# Patient Record
Sex: MALE | Race: WHITE | Employment: OTHER | URBAN - NONMETROPOLITAN AREA
[De-identification: names, ages, dates, MRNs, and addresses within clinical notes are randomized per-mention and may not be internally consistent; named-entity substitution may affect disease eponyms.]

---

## 2022-03-05 ENCOUNTER — HOSPITAL ENCOUNTER (EMERGENCY)
Age: 67
Discharge: HOME | End: 2022-03-05
Attending: EMERGENCY MEDICINE
Payer: MEDICARE

## 2022-03-05 VITALS
OXYGEN SATURATION: 96 % | DIASTOLIC BLOOD PRESSURE: 88 MMHG | TEMPERATURE: 97.4 F | RESPIRATION RATE: 19 BRPM | SYSTOLIC BLOOD PRESSURE: 150 MMHG | HEIGHT: 73 IN | HEART RATE: 98 BPM

## 2022-03-05 DIAGNOSIS — R04.0 EPISTAXIS: Primary | ICD-10-CM

## 2022-03-05 PROCEDURE — 2580000003 HC RX 258: Performed by: EMERGENCY MEDICINE

## 2022-03-05 PROCEDURE — 99285 EMERGENCY DEPT VISIT HI MDM: CPT

## 2022-03-05 PROCEDURE — 2500000003 HC RX 250 WO HCPCS: Performed by: EMERGENCY MEDICINE

## 2022-03-05 PROCEDURE — 30901 CONTROL OF NOSEBLEED: CPT

## 2022-03-05 RX ORDER — ASPIRIN 81 MG/1
81 TABLET, CHEWABLE ORAL DAILY
COMMUNITY

## 2022-03-05 RX ADMIN — PHENYLEPHRINE HYDROCHLORIDE 1 SPRAY: 0.5 SPRAY NASAL at 01:24

## 2022-03-05 RX ADMIN — TRANEXAMIC ACID 20 MG: 100 INJECTION, SOLUTION INTRAVENOUS at 01:25

## 2022-03-05 ASSESSMENT — PAIN SCALES - GENERAL: PAINLEVEL_OUTOF10: 0

## 2022-03-05 ASSESSMENT — PAIN - FUNCTIONAL ASSESSMENT: PAIN_FUNCTIONAL_ASSESSMENT: 0-10

## 2022-03-05 NOTE — ED PROVIDER NOTES
[address]  [facility] [address]  Phone: [address]    Chief Complaint   Patient presents with    Epistaxis       HPI    Dakotah Carrillo is a 78 y.o. male who presents with noted complaint. Patient's been doing fine. He frequently gets nosebleeds. Subsequently developed some nosebleed tonight. Could not get to stop. Is been going on for about an hour. Denies difficulty bleeding syncope or other issues. PAST MEDICAL HISTORY    No past medical history on file. SURGICAL HISTORY    No past surgical history on file. CURRENT MEDICATIONS    Current Outpatient Rx   Medication Sig Dispense Refill    aspirin 81 MG chewable tablet Take 81 mg by mouth daily         ALLERGIES    No Known Allergies    FAMILY HISTORY    No family history on file. SOCIAL HISTORY    Social History     Socioeconomic History    Marital status:      Spouse name: Not on file    Number of children: Not on file    Years of education: Not on file    Highest education level: Not on file   Occupational History    Not on file   Tobacco Use    Smoking status: Not on file    Smokeless tobacco: Not on file   Substance and Sexual Activity    Alcohol use: Not on file    Drug use: Not on file    Sexual activity: Not on file   Other Topics Concern    Not on file   Social History Narrative    Not on file     Social Determinants of Health     Financial Resource Strain:     Difficulty of Paying Living Expenses: Not on file   Food Insecurity:     Worried About Running Out of Food in the Last Year: Not on file    Lori of Food in the Last Year: Not on file   Transportation Needs:     Lack of Transportation (Medical): Not on file    Lack of Transportation (Non-Medical):  Not on file   Physical Activity:     Days of Exercise per Week: Not on file    Minutes of Exercise per Session: Not on file   Stress:     Feeling of Stress : Not on file   Social Connections:     Frequency of Communication with Friends and Family: Not on file    Frequency of Social Gatherings with Friends and Family: Not on file    Attends Restorationism Services: Not on file    Active Member of Clubs or Organizations: Not on file    Attends Club or Organization Meetings: Not on file    Marital Status: Not on file   Intimate Partner Violence:     Fear of Current or Ex-Partner: Not on file    Emotionally Abused: Not on file    Physically Abused: Not on file    Sexually Abused: Not on file   Housing Stability:     Unable to Pay for Housing in the Last Year: Not on file    Number of [place] in the Last Year: Not on file    Unstable Housing in the Last Year: Not on file       REVIEW OF SYSTEMS    Positive for nosebleed. No high fever chills. No chest pain. All systems negative except as marked. PHYSICAL EXAM    VITAL SIGNS: BP (!) 150/88   Pulse 98   Temp 97.4 °F (36.3 °C) (Temporal)   Resp 19   Ht 6' 1\" (1.854 m)   SpO2 96%    Constitutional:  Alert not toxic or ill,   HENT:  normocephalic, Atraumatic, large clot removed from the left naris and right nares. .  Be stemming from the left naris   cervical Spine: Normal range of motion,  No stridor. No tenderness, Supple,  Eyes:  No discharge or  Swelling,Conjunctiva normal, PERRL, EOMI,  Respiratory: No respiratory distress,                   RADIOLOGY    No orders to display       PROCEDURES    TXA was atomized into the nostrils. Afrin was administered to the nostrils.   No large clots no posterior bleeding    CONSULTS:  None      CRITICAL CARE:  None      SCREENINGS:  BP (!) 150/88   Pulse 98   Temp 97.4 °F (36.3 °C) (Temporal)   Resp 19   Ht 6' 1\" (1.854 m)   SpO2 96%     Screening For Hypertension and Follow-up (#317)  patient informed that blood pressure is abnormal and in the pre-hypertension range and should be rechecked by primary care          ED [facility] Pertinent Labs & Imaging studies reviewed. (See chart for details)  Acute epistaxis. Clinical exam is otherwise benign. Had large clots that we removed. And administered some TXA as well as some Afrin. We will continue to monitor here. He is not on significant blood thinners other than aspirin. REASSESSMENT  1:56 AM  Patient rechecked and updated on lab/xray status, progress and results. Patient was reassessed and condition was Improved after treatment. With TXA and clamping and Afrin. .. Needs nothing else at this time. No further bleeding or other issues after above treatments. Given Afrin to go home with over the weekend. Counseled in regards to risk for tachyphylaxis.   He can follow-up with PCP and/or ENT referral      FINAL IMPRESSION    1. Epistaxis         PATIENT REFERRED TO:  Alisia Jimenes MD  [address] [place] [address]/Marco Gilbert [address]  [phone]    Call   For evaluation      DISCHARGE MEDICATIONS:  New Prescriptions    No medications on file           Geraldine Connelly MD  03/05/22 0153

## 2022-03-05 NOTE — ED NOTES
Pt stable A&O x 3 given discharge and follow up info. Pt voiced no concerns and discharged from ER to self to home. Pt ambulated out of ER with no complications .        Tan Silveira RN  03/05/22 0832

## 2022-03-05 NOTE — ED TRIAGE NOTES
Pt comes into ER room 2 from triage with a nose bleed. The patient says that he gets nose bleeds often but non that have lasted over an hour like this one.

## 2022-03-05 NOTE — ED NOTES
Dr. Therese Mendoza at bedside with nasal speculum and nose bleed tray. Bleeding has stopped and medications were given.       Dean Cadet RN  03/05/22 3726

## 2023-11-29 ENCOUNTER — HOSPITAL ENCOUNTER (EMERGENCY)
Age: 68
Discharge: HOME | End: 2023-11-29
Payer: MEDICARE

## 2023-11-29 VITALS
RESPIRATION RATE: 16 BRPM | OXYGEN SATURATION: 96 % | DIASTOLIC BLOOD PRESSURE: 82 MMHG | SYSTOLIC BLOOD PRESSURE: 136 MMHG | HEIGHT: 73 IN | TEMPERATURE: 97.6 F | BODY MASS INDEX: 28.49 KG/M2 | HEART RATE: 99 BPM | WEIGHT: 215 LBS

## 2023-11-29 DIAGNOSIS — R04.0 EPISTAXIS: Primary | ICD-10-CM

## 2023-11-29 PROCEDURE — 2500000003 HC RX 250 WO HCPCS

## 2023-11-29 PROCEDURE — 99213 OFFICE O/P EST LOW 20 MIN: CPT

## 2023-11-29 PROCEDURE — 2580000003 HC RX 258

## 2023-11-29 PROCEDURE — 96374 THER/PROPH/DIAG INJ IV PUSH: CPT

## 2023-11-29 RX ORDER — SIMVASTATIN 20 MG
20 TABLET ORAL DAILY
COMMUNITY
Start: 2023-11-27

## 2023-11-29 RX ORDER — LISINOPRIL 10 MG/1
10 TABLET ORAL 2 TIMES DAILY
COMMUNITY
Start: 2023-11-27

## 2023-11-29 RX ORDER — TAMSULOSIN HYDROCHLORIDE 0.4 MG/1
0.4 CAPSULE ORAL DAILY
COMMUNITY
Start: 2023-11-27

## 2023-11-29 RX ORDER — FINASTERIDE 5 MG/1
5 TABLET, FILM COATED ORAL DAILY
COMMUNITY
Start: 2023-11-27

## 2023-11-29 RX ADMIN — TRANEXAMIC ACID 1000 MG: 100 INJECTION, SOLUTION INTRAVENOUS at 16:00

## 2023-11-29 ASSESSMENT — PAIN - FUNCTIONAL ASSESSMENT: PAIN_FUNCTIONAL_ASSESSMENT: NONE - DENIES PAIN

## 2023-11-29 NOTE — ED NOTES
Patient understood instructions verbally,  Follow up with PCP with any concerns,  Worse nose bleeding follow up with ED.ambulated self to lobby,stable condition. All belongings with patient. Patient home with gauze in left side of nose.       Fallon Russo LPN  58/75/65 [id number]

## 2023-11-29 NOTE — ED TRIAGE NOTES
Arrives to [facility] for the evaluation of left nostril bleeding. Initially started to have a nose bleed 2 days ago. It did resolve for a day and started again this morning around 0630. Had the same issue a year ago. Followed up with his ENT in [place] today and had the nose cauterized. Soon after started having bleeding again. Called the ENT office and was instructed to be seen in UC or ER. Arrives with packing in the nose to control bleeding. Denies headache. BP within normal limits. Takes a Baby ASA daily, no other anticoagulant. Wife in room. Waiting provider to assess.